# Patient Record
Sex: MALE | Race: WHITE | URBAN - METROPOLITAN AREA
[De-identification: names, ages, dates, MRNs, and addresses within clinical notes are randomized per-mention and may not be internally consistent; named-entity substitution may affect disease eponyms.]

---

## 2017-06-22 ENCOUNTER — OFFICE VISIT (OUTPATIENT)
Dept: FAMILY MEDICINE | Facility: CLINIC | Age: 27
End: 2017-06-22

## 2017-06-22 VITALS
DIASTOLIC BLOOD PRESSURE: 82 MMHG | HEIGHT: 75 IN | SYSTOLIC BLOOD PRESSURE: 116 MMHG | WEIGHT: 267.2 LBS | OXYGEN SATURATION: 98 % | HEART RATE: 80 BPM | RESPIRATION RATE: 16 BRPM | BODY MASS INDEX: 33.22 KG/M2 | TEMPERATURE: 98.7 F

## 2017-06-22 DIAGNOSIS — G47.33 OBSTRUCTIVE SLEEP APNEA SYNDROME: ICD-10-CM

## 2017-06-22 DIAGNOSIS — R06.83 SNORING: Primary | ICD-10-CM

## 2017-06-22 DIAGNOSIS — K21.9 GASTROESOPHAGEAL REFLUX DISEASE WITHOUT ESOPHAGITIS: ICD-10-CM

## 2017-06-22 NOTE — PATIENT INSTRUCTIONS
Here is the plan from today's visit    1. Snoring  Have sent you to a sleep specialist to have the conversation on what to do next  - Sleep Study Referral - INTERNAL; Future  - [facility]  Ph [phone] (Age 15 and up)    2. Obstructive sleep apnea syndrome  As above.   - Sleep Study Referral - INTERNAL; Future    3. Gastroesophageal reflux disease without esophagitis  Zantac 150 mg twice a day for 2 months or so and then can restart if symptoms come back  Tums as needed     Weight loss:  Mindless Eating  Always eating off of small plates  Half the plate should be vegetables  Weekly weighing   Plan to lose half pound per week maximum    Hearing:  OK to come back for us to check this or we can refer you for more formal testing. Any activity that is noisy, have to protect your hearing.         Please call or return to clinic if your symptoms don't go away.    Follow up plan  as needed     Thank you for coming to [facility] today.  Lab Testing:  **If you had lab testing today and your results are reassuring or normal they will be mailed to you or sent through [employer] within 7 days.   **If the lab tests need quick action we will call you with the results.  The phone number we will call with results is # [phone] (home) . If this is not the best number please call our clinic and change the number.  Medication Refills:  If you need any refills please call your pharmacy and they will contact us.   If you need to  your refill at a new pharmacy, please contact the new pharmacy directly. The new pharmacy will help you get your medications transferred faster.   Scheduling:  If you have any concerns about today's visit or wish to schedule another appointment please call our office during normal business hours [phone] (8-5:00 M-F)  If a referral was made to a [facility] and you don't get a call from central scheduling please call [phone].  If a  Mammogram was ordered for you at [facility] call [phone] to schedule or change your appointment.  If you had an XRay/CT/Ultrasound/MRI ordered the number is [phone] to schedule or change your radiology appointment.   Medical Concerns:  If you have urgent medical concerns please call [phone] at any time of the day.  If you have a medical emergency please call [phone].

## 2017-06-22 NOTE — PROGRESS NOTES
ALEIDA       Josr is a 26 year old  male  who presents for the new concern(s) of:    Chief Complaint   Patient presents with     Physical     Snoring     snors loudly especially when nose is stuffy      Abdominal Pain     having regular heartburn using OTC famotidine     Hearing Problem     difficulties hearing with a lot of background noise      Sleep apnea - after the corrective surgery, never had a follow up. Does have fatigue a lot, doesn't feel rested  Also having trouble falling asleep and if wakes up it is hard for him to fall asleep. + snoring    Heartburn - days where he wakes up with it.  Started taking OTC Famotidine - one pill when it bothers him or when he exercises. Trying to pin down if triggered by any foods but not for sure.  Has had this for as long as he can remember but more frequent last 2 years.      Weight - working on portions. Has steadily increased over the years but now holding steady.     Had full cholesterol screening and all was ok in the last 2 - 3y    Esxercise: now going to a gym and starting [employer] which he is likely,   Is going out kayaking or swimming with his fiance.   Etoh- pushing the 4 in a sitting. Is very mindful.  Does drink 2 drinks most evenings and when out might drink 4 - 5 in 6 hours.     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 (Patient not taking: Reported on 6/22/2017) 30 g 0        No Known Allergies             Review of Systems:  "  CONSTITUTIONAL: no fatigue, no unexpected change in weight  SKIN: no worrisome rashes, no worrisome moles, no worrisome lesions  EYES: no acute vision problems or changes  ENT: no ear problems, no mouth problems, no throat problems  RESP: no significant cough, no shortness of breath  CV: no chest pain, no palpitations, no new or worsening peripheral edema  GI: no nausea, no vomiting, no constipation, no diarrhea  : no frequency, no dysuria, no hematuria  MS: no claudication, no myalgias, no joint aches  NEURO: no weakness, no dizziness, no syncope, no headaches  ENDOCRINE: no temperature intolerance, no skin/hair changes  HEME: no easy bruising, no bleeding problems  PSYCHIATRIC: NEGATIVE for changes in mood or affect            Physical Exam:     Vitals:    06/22/17 1017   BP: 116/82   Pulse: 80   Resp: 16   Temp: 98.7  F (37.1  C)   TempSrc: Oral   SpO2: 98%   Weight: 267 lb 3.2 oz (121.2 kg)   Height: 6' 2.61\" (189.5 cm)     Body mass index is 33.75 kg/(m^2).  Vitals were reviewed and were normal  GENERAL: healthy, alert, well nourished, well hydrated, no distress  HENT: ear canals- normal; TMs- normal; Nose- normal; Mouth- no ulcers, no lesions - some mild post op changes but still with minimal posterior pharyngeal space.   NECK: no tenderness, no adenopathy, no asymmetry, no masses, no stiffness; thyroid- normal to palpation  RESP: lungs clear to auscultation - no rales, no rhonchi, no wheezes  CV: regular rates and rhythm, normal S1 S2, no S3 or S4 and no murmur, no click or rub -  ABDOMEN: soft, no tenderness, no  hepatosplenomegaly, no masses, normal bowel sounds  SKIN: no suspicious lesions, no rashes  PSYCH: Alert and oriented times 3; speech- coherent , normal rate and volume; able to articulate logical thoughts, able to abstract reason, no tangential thoughts, no hallucinations or delusions, affect- normal    No results found for any previous visit.      Assessment and Plan     Josr was seen today " for physical, snoring, abdominal pain and hearing problem.    Diagnoses and all orders for this visit:    CPE - up to date.  See below for weight discussion     Snoring - limited insurance so will have him meet with sleep medicine first to discuss options and cost. I believe he needs sleep study but also has some insomnia which might need some sleep hygiene.   -     Sleep Study Referral - INTERNAL; Future    Obstructive sleep apnea syndrome - as above  -     Sleep Study Referral - INTERNAL; Future    Gastroesophageal reflux disease without esophagitis - reviewed and recommended he take Zantac regluraly for 2 months to treat any irritation and then can back of to more prn for stretches.  Aware that weight makes this worse.  No warning signs. Reviewed what foods to avoid.   -     ranitidine (ZANTAC) 150 MG tablet; Take 1 tablet (150 mg) by mouth 2 times daily    Weight  - most of time discussing approaches to eating healthy.     Decreased hearing - offered to test her or refer and he chose to do nothing at this time. Recommended he be very careful with noise and using protection.     Etoh - drinking is borderline at this time -aware of what he should be doing. Will monitor.     There are no discontinued medications.    Options for treatment and follow-up care were reviewed with the patient . Josr Cai  engaged in the decision making process and verbalized understanding of the options discussed and agreed with the final plan.    Radha Gil MD

## 2017-06-22 NOTE — MR AVS SNAPSHOT
After Visit Summary   6/22/2017    Josr Cai    MRN: [id number]           Patient Information     Date Of Birth          1990        Visit Information        Provider Department      6/22/2017 10:00 AM Radha Gil MD [facility]        Today's Diagnoses     Snoring    -  1    Obstructive sleep apnea syndrome        Gastroesophageal reflux disease without esophagitis          Care Instructions        Here is the plan from today's visit    1. Snoring  Have sent you to a sleep specialist to have the conversation on what to do next  - Sleep Study Referral - INTERNAL; Future  - [facility]  Ph [phone] (Age 15 and up)    2. Obstructive sleep apnea syndrome  As above.   - Sleep Study Referral - INTERNAL; Future    3. Gastroesophageal reflux disease without esophagitis  Zantac 150 mg twice a day for 2 months or so and then can restart if symptoms come back  Tums as needed     Weight loss:  Mindless Eating  Always eating off of small plates  Half the plate should be vegetables  Weekly weighing   Plan to lose half pound per week maximum    Hearing:  OK to come back for us to check this or we can refer you for more formal testing. Any activity that is noisy, have to protect your hearing.         Please call or return to clinic if your symptoms don't go away.    Follow up plan  as needed     Thank you for coming to [place] today.  Lab Testing:  **If you had lab testing today and your results are reassuring or normal they will be mailed to you or sent through [employer] within 7 days.   **If the lab tests need quick action we will call you with the results.  The phone number we will call with results is # [phone] (home) . If this is not the best number please call our clinic and change the number.  Medication Refills:  If you need any refills please call your pharmacy and they will contact us.   If you need to  your refill at a new  pharmacy, please contact the new pharmacy directly. The new pharmacy will help you get your medications transferred faster.   Scheduling:  If you have any concerns about today's visit or wish to schedule another appointment please call our office during normal business hours [phone] (8-5:00 M-F)  If a referral was made to a [facility] and you don't get a call from central scheduling please call [phone].  If a Mammogram was ordered for you at [facility] call [phone] to schedule or change your appointment.  If you had an XRay/CT/Ultrasound/MRI ordered the number is [phone] to schedule or change your radiology appointment.   Medical Concerns:  If you have urgent medical concerns please call [phone] at any time of the day.  If you have a medical emergency please call [phone].          Follow-ups after your visit        Additional Services     Sleep Study Referral - INTERNAL       Please be aware that coverage of these services is subject to the terms and limitations of your health insurance plan.  Call member services at your health plan with any benefit or coverage questions.      Please bring the following to your appointment:    >>   List of current medications   >>   This referral request   >>   Any documents/labs given to you for this referral    [facility]  Ph [phone] (Age 15 and up)                  Future tests that were ordered for you today     Open Future Orders        Priority Expected Expires Ordered    Sleep Study Referral - INTERNAL Routine  6/22/2018 6/22/2017            Who to contact     Please call your clinic at [phone] to:    Ask questions about your health    Make or cancel appointments    Discuss your medicines    Learn about your test results    Speak to your doctor   If you have compliments or concerns about an experience at your clinic, or if you wish to file a complaint, please contact [facility]  "[place] [facility] Patient Relations at [phone] or email us at [email or web address]         Additional Information About Your Visit        [employer] Information     [employer] is an electronic gateway that provides easy, online access to your medical records. With [employer], you can request a clinic appointment, read your test results, renew a prescription or communicate with your care team.     To sign up for [employer] visit the website at [email or web address])   You will be asked to enter the access code listed below, as well as some personal information. Please follow the directions to create your username and password.     Your access code is: [email or web address]  Expires: 2017 11:06 AM     Your access code will  in 90 days. If you need help or a new code, please contact your [facility] or call [phone] for assistance.        Care EveryWhere ID     This is your Care EveryWhere ID. This could be used by other organizations to access your [facility] medical records  [id number]        Your Vitals Were     Pulse Temperature Respirations Height Pulse Oximetry BMI (Body Mass Index)    80 98.7  F (37.1  C) (Oral) 16 6' 2.61\" (189.5 cm) 98% 33.75 kg/m2       Blood Pressure from Last 3 Encounters:   17 116/82   16 126/81   10/08/15 125/82    Weight from Last 3 Encounters:   17 267 lb 3.2 oz (121.2 kg)   16 253 lb (114.8 kg)   10/08/15 240 lb (108.9 kg)                 Today's Medication Changes          These changes are accurate as of: 17 11:07 AM.  If you have any questions, ask your nurse or doctor.               Start taking these medicines.        Dose/Directions    ranitidine 150 MG tablet   Commonly known as:  ZANTAC   Used for:  Gastroesophageal reflux disease without esophagitis   Started by:  Radha Gil MD        Dose:  150 mg   Take 1 tablet (150 mg) by mouth 2 times daily   Quantity:  180 tablet   Refills:  1          "   Where to get your medicines      These medications were sent to [id number] - [place] [address] [place] [address] [address] [facility], [facility] [address]     Phone:  [phone]     ranitidine 150 MG tablet                Primary Care Provider Office Phone # Fax #    Kathleen Kala Ledbetter [phone]       [facility] [address]  [facility] [address]        Equal Access to Services     YASMIN BAILEY : Hadii aad ku hadasho Soomaali, waaxda luqadaha, qaybta kaalmada adeegyada, waxay idiin hayaan adeeg kharash la'kt . So [facility] [phone].    ATENCIÓN: Si habla español, tiene a kirkland disposición servicios gratuitos de asistencia lingüística. [facility] [phone].    We comply with applicable federal civil rights laws and [place] laws. We do not discriminate on the basis of race, color, national origin, age, disability sex, sexual orientation or gender identity.            Thank you!     Thank you for choosing [facility]  for your care. Our goal is always to provide you with excellent care. Hearing back from our patients is one way we can continue to improve our services. Please take a few minutes to complete the written survey that you may receive in the mail after your visit with us. Thank you!             Your Updated Medication List - Protect others around you: Learn how to safely use, store and throw away your medicines at www.disposemymeds.org.          This list is accurate as of: 6/22/17 11:07 AM.  Always use your most recent med list.                   Brand Name Dispense Instructions for use Diagnosis    hydrocortisone 1 % cream    CORTAID    30 g    Apply sparingly to affected area three times daily for 14 days.    Rash       polyethylene glycol powder    MIRALAX    510 g    Take 17 g by mouth daily    Constipation, unspecified constipation type       ranitidine 150 MG tablet    ZANTAC    180 tablet    Take 1 tablet (150  mg) by mouth 2 times daily    Gastroesophageal reflux disease without esophagitis

## 2017-08-28 ENCOUNTER — MYC MEDICAL ADVICE (OUTPATIENT)
Dept: FAMILY MEDICINE | Facility: CLINIC | Age: 27
End: 2017-08-28

## 2017-08-28 DIAGNOSIS — K21.9 GASTROESOPHAGEAL REFLUX DISEASE WITHOUT ESOPHAGITIS: ICD-10-CM

## 2017-09-07 ENCOUNTER — OFFICE VISIT (OUTPATIENT)
Dept: SLEEP MEDICINE | Facility: CLINIC | Age: 27
End: 2017-09-07
Attending: NURSE PRACTITIONER
Payer: COMMERCIAL

## 2017-09-07 VITALS
HEART RATE: 80 BPM | HEIGHT: 74 IN | RESPIRATION RATE: 16 BRPM | WEIGHT: 273 LBS | BODY MASS INDEX: 35.04 KG/M2 | OXYGEN SATURATION: 94 % | DIASTOLIC BLOOD PRESSURE: 78 MMHG | SYSTOLIC BLOOD PRESSURE: 118 MMHG

## 2017-09-07 DIAGNOSIS — G47.33 OSA (OBSTRUCTIVE SLEEP APNEA): Primary | ICD-10-CM

## 2017-09-07 DIAGNOSIS — E66.9 OBESITY (BMI 35.0-39.9 WITHOUT COMORBIDITY): ICD-10-CM

## 2017-09-07 DIAGNOSIS — G47.00 INSOMNIA, UNSPECIFIED TYPE: ICD-10-CM

## 2017-09-07 PROCEDURE — 99211 OFF/OP EST MAY X REQ PHY/QHP: CPT | Mod: ZF

## 2017-09-07 NOTE — NURSING NOTE
"Chief Complaint   Patient presents with     Consult     Dx with sleep apnea 10 years ago and tried cpap at that time and was unable to tolerate it at the time.  Would like to discuss starting over with cpap       Initial /78  Pulse 80  Resp 16  Ht 1.88 m (6' 2\")  Wt 123.8 kg (273 lb)  SpO2 94%  BMI 35.05 kg/m2 Estimated body mass index is 35.05 kg/(m^2) as calculated from the following:    Height as of this encounter: 1.88 m (6' 2\").    Weight as of this encounter: 123.8 kg (273 lb).  Medication Reconciliation: complete      [facility]      "

## 2017-09-07 NOTE — PROGRESS NOTES
"Allergies:    No Known Allergies    Medications:    Current Outpatient Prescriptions   Medication Sig Dispense Refill     ranitidine (ZANTAC) 150 MG tablet Take 1 tablet (150 mg) by mouth 2 times daily 180 tablet 3       Problem List:  Patient Active Problem List    Diagnosis Date Noted     Gastroesophageal reflux disease without esophagitis 06/22/2017     Priority: Medium     Snoring 06/22/2017     Priority: Medium     Obstructive sleep apnea syndrome 06/22/2017     Priority: Medium     Shin splints, left, initial encounter 05/23/2016     Priority: Medium     Flat feet, bilateral 05/23/2016     Priority: Medium        Past Medical/Surgical History:  Past Medical History:   Diagnosis Date     Anxiety      Dysthymia      Flat feet      Sleep apnea      Past Surgical History:   Procedure Laterality Date     HC TOOTH EXTRACTION W/FORCEP       SOFT TISSUE SURGERY      nose and throat due to sleep apnea           Physical Examination:  Vitals: /78  Pulse 80  Resp 16  Ht 1.88 m (6' 2\")  Wt 123.8 kg (273 lb)  SpO2 94%  BMI 35.05 kg/m2  BMI= Body mass index is 35.05 kg/(m^2).    Neck Cir (cm): 44 cm    Star Total Score 9/7/2017   Total score - Star 0       GENERAL APPEARANCE: healthy, alert and no distress  EYES: Eyes grossly normal to inspection  HENT: oropharynx crowded, soft palate dependent with small oral airway  NECK: thyroid normal to palpation  RESP: lungs clear to auscultation - no rales, rhonchi or wheezes  CV: regular rates and rhythm, normal S1 S2, no S3 or S4 and no murmur, click or rub  Extremities are without clubbing, edema  Musculoskeletal:  Mallampati Class: IV.  Tonsillar Stage: 0  surgically removed.  Dental: Dentition in good condition.  Good advancement of lower jaw without tmd  Skin: without facial rash        CC: Radha Gil"

## 2017-09-07 NOTE — MR AVS SNAPSHOT
"              After Visit Summary   9/7/2017    Josr Cai    MRN: [id number]           Patient Information     Date Of Birth          1990        Visit Information        Provider Department      9/7/2017 3:00 PM Susana Lyons APRN [facility], [place], Sleep Study        Today's Diagnoses     SHWETHA (obstructive sleep apnea)    -  1      Care Instructions    MY TREATMENT INFORMATION FOR SLEEP APNEA-  Josr Cai    DOCTOR : Susana Lyons  SLEEP CENTER :      MY CONTACT NUMBER: [phone]  No laying down on couch for 7 days  No clock watching at night  Avoid use of screens 45 minutes before sleep, change light setting  Hst in 10-12 days  Contact me in 7 days via my chart re ability to get 6-7 hrs  Am I having a sleep study at a sleep center?  Make sure you have an appointment for the study before you leave!    Am I having a home sleep study?  Watch this video:  https://www.[employer].com/watch?v=CteI_GhyP9g&list=[email or web address]    Frequently asked questions:  1. What is Obstructive Sleep Apnea (SHWETHA)? SHWETHA is the most common type of sleep apnea. Apnea means, \"without breath.\"  Apnea is most often caused by narrowing or collapse of the upper airway as muscles relax during sleep.   Almost everyone has occasional apneas. Most people with sleep apnea have had brief interruptions at night frequently for many years.  The severity of sleep apnea is related to how frequent and severe the events are.   2. What are the consequences of SHWETHA? Symptoms include: feeling sleepy during the day, snoring loudly, gasping or stopping of breathing, trouble sleeping, and occasionally morning headaches or heartburn at night.  Sleepiness can be serious and even increase the risk of falling asleep while driving. Other health consequences may include development of high blood pressure and other cardiovascular disease in persons who are susceptible. Untreated SHWETHA  can contribute to heart disease, stroke and " diabetes.   3. What are the treatment options? In most situations, sleep apnea is a lifelong disease that must be managed with daily therapy. Medications are not effective for sleep apnea and surgery is generally not considered until other therapies have been tried. Your treatment is your choice . Continuous Positive Airway (CPAP) works right away and is the therapy that is effective in nearly everyone. An oral device to hold your jaw forward is usually the next most reliable option. Other options include postioning devices (to keep you off your back), weight loss, and surgery including a tongue pacing device. There is more detail about some of these options below.    Important tips for using CPAP and similar devices   Know your equipment:  CPAP is continuous positive airway pressure that prevents obstructive sleep apnea by keeping the throat from collapsing while you are sleeping. In most cases, the device is  smart  and can slowly self-adjusts if your throat collapses and keeps a record every day of how well you are treated-this information is available to you and your care team.  BPAP is bilevel positive airway pressure that keeps your throat open and also assists each breath with a pressure boost to maintain adequate breathing.  Special kinds of BPAP are used in patients who have inadequate breathing from lung or heart disease. In most cases, the device is  smart  and can slowly self-adjusts to assist breathing. Like CPAP, the device keeps a record of how well you are treated.  Your mask is your connection to the device. You get to choose what feels most comfortable and the staff will help to make sure if fits. Here: are some examples of the different masks that are available:       Key points to remember on your journey with sleep apnea:  1. Sleep study.  PAP devices often need to be adjusted during a sleep study to show that they are effective and adjusted right.  2. Good tips to remember: Try wearing just the  mask during a quiet time during the day so your body adapts to wearing it. A humidifier is recommended for comfort in most cases to prevent drying of your nose and throat. Allergy medication from your provider may help you if you are having nasal congestion.  3. Getting settled-in. It takes more than one night for most of us to get used to wearing a mask. Try wearing just the mask during a quiet time during the day so your body adapts to wearing it. A humidifier is recommended for comfort in most cases. Our team will work with you carefully on the first day and will be in contact within 4 days and again at 2 and 4 weeks for advice and remote device adjustments. Your therapy is evaluated by the device each day.   4. Use it every night. The more you are able to sleep naturally for 7-8 hours, the more likely you will have good sleep and to prevent health risks or symptoms from sleep apnea. Even if you use it 4 hours it helps. Occasionally all of us are unable to use a medical therapy, in sleep apnea, it is not dangerous to miss one night.   5. Communicate. Call our skilled team on the number provided on the first day if your visit for problems that make it difficult to wear the device. Over 2 out of 3 patients can learn to wear the device long-term with help from our team. Remember to call our team or your sleep providers if you are unable to wear the device as we may have other solutions for those who cannot adapt to mask CPAP therapy. It is recommended that you sleep your sleep provider within the first 3 months and yearly after that if you are not having problems.   Take care of your equipment. Make sure you clean your mask and tubing using directions every day and that your filter and mask are replaced as recommended or if they are not working.     BESIDES CPAP, WHAT OTHER THERAPIES ARE THERE?    Positioning Device  Positioning devices are generally used when sleep apnea is mild and only occurs on your back.This  example shows a pillow that straps around the waist. It may be appropriate for those whose sleep study shows milder sleep apnea that occurs primarily when lying flat on one's back. Preliminary studies have shown benefit but effectiveness at home may need to be verified by a home sleep test. These devices are generally not covered by medical insurance.  Positioning Device  Positioning devices are generally used when sleep apnea is mild and only occurs on your back.This example shows a pillow that straps around the waist. It may be appropriate for those whose sleep study shows milder sleep apnea that occurs primarily when lying flat on one's back. Preliminary studies have shown benefit but effectiveness at home may need to be verified by a home sleep test. These devices are generally not covered by medical insurance.    BeSmart or   MemBlaze for snore bumper pillow;or backpack w/ chest strap stuffed w/ pillow; ; or t shirt 2 / pockets sew in tennis balls    Oral Appliance  What is oral appliance therapy?  An oral appliance device fits on your teeth at night like a retainer used after having braces. The device is made by a specialized dentist and requires several visits over 1-2 months before a manufactured device is made to fit your teeth and is adjusted to prevent your sleep apnea. Once an oral device is working properly, snoring should be improved. A home sleep test may be recommended at that time if to determine whether the sleep apnea is adequately treated.       Some things to remember:  -Oral devices are often, but not always, covered by your medical insurance. Be sure to check with your insurance provider.   -If you are referred for oral therapy, you will be given a list of specialized dentists to consider or you may choose to visit the Web site of the American Academy of Dental Sleep Medicine  -Oral devices are less likely to work if you have severe sleep apnea or are extremely overweight.     More detailed  information  An oral appliance is a small acrylic device that fits over the upper and lower teeth  (similar to a retainer or a mouth guard). This device slightly moves jaw forward, which moves the base of the tongue forward, opens the airway, improves breathing for effective treat snoring and obstructive sleep apnea in perhaps 7 out of 10 people .  The best working devices are custom-made by a dental device  after a mold is made of the teeth 1, 2, 3.  When is an oral appliance indicated?  Oral appliance therapy is recommended as a first-line treatment for patients with primary snoring, mild sleep apnea, and for patients with moderate sleep apnea who prefer appliance therapy to use of CPAP4, 5. Severity of sleep apnea is determined by sleep testing and is based on the number of respiratory events per hour of sleep.   How successful is oral appliance therapy?  The success rate of oral appliance therapy in patients with mild sleep apnea is 75-80% while in patients with moderate sleep apnea it is 50-70%. The chance of success in patients with severe sleep apnea is 40-50%. The research also shows that oral appliances have a beneficial effect on the cardiovascular health of SHWETHA patients at the same magnitude as CPAP therapy7.  Oral appliances should be a second-line treatment in cases of severe sleep apnea, but if not completely successful then a combination therapy utilizing CPAP plus oral appliance therapy may be effective. Oral appliances tend to be effective in a broad range of patients although studies show that the patients who have the highest success are females, younger patients, those with milder disease, and less severe obesity. 3, 6.   Finding a dentist that practices dental sleep medicine  Specific training is available through the American Academy of Dental Sleep Medicine for dentists interested in working in the field of sleep. To find a dentist who is educated in the field of sleep and the use  of oral appliances, near you, visit the Web site of the American Academy of Dental Sleep Medicine.    References  1. Peña, et al. Objectively measured vs self-reported compliance during oral appliance therapy for sleep-disordered breathing. Chest 2013; 144(5): 4187-4479.  2. Kayla et al. Objective measurement of compliance during oral appliance therapy for sleep-disordered breathing. Thorax 2013; 68(1): 91-96.  3. Lonnie et al. Mandibular advancement devices in 620 men and women with SHWETHA and snoring: tolerability and predictors of treatment success. Chest 2004; 125: 7731-1546.  4. Kori, et al. Oral appliances for snoring and SHWETHA: a review. Sleep 2006; 29: 244-262.  5. Man et al. Oral appliance treatment for SHWETHA: an update. J Clin Sleep Med 2014; 10(2): 215-227.  6. Estrella et al. Predictors of OSAH treatment outcome. J Dent Res 2007; 86: 5100-4675.      Weight Loss:    Weight loss is a long-term strategy that may improve sleep apnea in some patients.    Weight management is a personal decision.  If you are interested in exploring weight loss strategies, the following discussion covers the impact on weight loss on sleep apnea and the approaches that may be successful.    Weight loss decreases severity of sleep apnea in most people with obesity. For those with mild obesity who have developed snoring with weight gain, even 15-30 pound weight loss can improve and occasionally eliminate sleep apnea.  Structured and life-long dietary and health habits are necessary to lose weight and keep healthier weight levels.     Though there may be significant health benefits from weight loss, long-term weight loss is very difficult to achieve- studies show success with dietary management in less than 10% of people. In addition, substantial weight loss may require years of dietary control and may be difficult if patients have severe obesity. In these cases, surgical management may be considered.  Finally,  older individuals who have tolerated obesity without health complications may be less likely to benefit from weight loss strategies.      [unfilled]    Surgery:    Surgery for obstructive sleep apnea is considered generally only when other therapies fail to work. Surgery may be discussed with you if you are having a difficult time tolerating CPAP and or when there is an abnormal structure that requires surgical correction.  Nose and throat surgeries often enlarge the airway to prevent collapse.  Most of these surgeries create pain for 1-2 weeks and up to half of the most common surgeries are not effective throughout life.  You should carefully discuss the benefits and drawbacks to surgery with your sleep provider and surgeon to determine if it is the best solution for you.   More information  Surgery for SHWETHA is directed at areas that are responsible for narrowing or complete obstruction of the airway during sleep.  There are a wide range of procedures available to enlarge and/or stabilize the airway to prevent blockage of breathing in the three major areas where it can occur: the palate, tongue, and nasal regions.  Successful surgical treatment depends on the accurate identification of the factors responsible for obstructive sleep apnea in each person.  A personalized approach is required because there is no single treatment that works well for everyone.  Because of anatomic variation, consultation with an examination by a sleep surgeon is a critical first step in determining what surgical options are best for each patient.  In some cases, examination during sedation may be recommended in order to guide the selection of procedures.  Patients will be counseled about risks and benefits as well as the typical recovery course after surgery. Surgery is typically not a cure for a person s SHWETHA.  However, surgery will often significantly improve one s SHWETHA severity (termed  success rate ).  Even in the absence of a cure,  surgery will decrease the cardiovascular risk associated with OSA7; improve overall quality of life8 (sleepiness, functionality, sleep quality, etc).      Palate Procedures:  Patients with SHWETHA often have narrowing of their airway in the region of their tonsils and uvula.  The goals of palate procedures are to widen the airway in this region as well as to help the tissues resist collapse.  Modern palate procedure techniques focus on tissue conservation and soft tissue rearrangement, rather than tissue removal.  Often the uvula is preserved in this procedure. Residual sleep apnea is common in patient after pharyngoplasty with an average reduction in sleep apnea events of 33%2.      Tongue Procedures:  ExamWhile patients are awake, the muscles that surround the throat are active and keep this region open for breathing. These muscles relax during sleep, allowing the tongue and other structures to collapse and block breathing.  There are several different tongue procedures available.  Selection of a tongue base procedure depends on characteristics seen on physical exam.  Generally, procedures are aimed at removing bulky tissues in this area or preventing the back of the tongue from falling back during sleep.  Success rates for tongue surgery range from 50-62%3.    Hypoglossal Nerve Stimulation:  Hypoglossal nerve stimulation has recently received approval from the [place] Food and Drug Administration for the treatment of obstructive sleep apnea.  This is based on research showing that the system was safe and effective in treating sleep apnea6.  Results showed that the median AHI score decreased 68%, from 29.3 to 9.0. This therapy uses an implant system that senses breathing patterns and delivers mild stimulation to airway muscles, which keeps the airway open during sleep.  The system consists of three fully implanted components: a small generator (similar in size to a pacemaker), a breathing sensor, and a  stimulation lead.  Using a small handheld remote, a patient turns the therapy on before bed and off upon awakening.    Candidates for this device must be greater than 22 years of age, have moderate to severe SHWETHA (AHI between 20-65), BMI less than 32, have tried CPAP/oral appliance without success, and have appropriate upper airway anatomy (determined by a sleep endoscopy performed by Dr. Shore).    Hypoglossal Nerve Stimulation Pathway:    The sleep surgeon s office will work with the patient through the insurance prior-authorization process (including communications and appeals).    Nasal Procedures:  Nasal obstruction can interfere with nasal breathing during the day and night.  Studies have shown that relief of nasal obstruction can improve the ability of some patients to tolerate positive airway pressure therapy for obstructive sleep apnea1.  Treatment options include medications such as nasal saline, topical corticosteroid and antihistamine sprays, and oral medications such as antihistamines or decongestants. Non-surgical treatments can include external nasal dilators for selected patients. If these are not successful by themselves, surgery can improve the nasal airway either alone or in combination with these other options.      Combination Procedures:  Combination of surgical procedures and other treatments may be recommended, particularly if patients have more than one area of narrowing or persistent positional disease.  The success rate of combination surgery ranges from 66-80%2,3.    References  1. Geovanny MOLINA. The Role of the Nose in Snoring and Obstructive Sleep Apnoea: An Update.  Eur Arch Otorhinolaryngol. 2011; 268: 1365-73.  2.  Kathy SM; Sapna JA; Annamaria JR; Pallanch JF; Miguel Angel MB; Devika SG; Tiffanie NICHOLSON. Surgical modifications of the upper airway for obstructive sleep apnea in adults: a systematic review and meta-analysis. SLEEP 2010;33(10):6039-6167. Bobby BURROWS. Hypopharyngeal surgery in  obstructive sleep apnea: an evidence-based medicine review.  Arch Otolaryngol Head Neck Surg. 2006 Feb;132(2):206-13.  3. Nehemiah YH1, Nat Y, Ismael SANDY. The efficacy of anatomically based multilevel surgery for obstructive sleep apnea. Otolaryngol Head Neck Surg. 2003 Oct;129(4):327-35.  4. Bobby BURROWS, Goldberg A. Hypopharyngeal Surgery in Obstructive Sleep Apnea: An Evidence-Based Medicine Review. Arch Otolaryngol Head Neck Surg. 2006 Feb;132(2):206-13.  5. Lynsey CASTREJON et al. Upper-Airway Stimulation for Obstructive Sleep Apnea.  N Engl J Med. 2014 Jan 9;370(2):139-49.  6. Annie Y et al. Increased Incidence of Cardiovascular Disease in Middle-aged Men with Obstructive Sleep Apnea. Am J Respir Crit Care Med; 2002 166: 159-165  7. Schwartz EM et al. Studying Life Effects and Effectiveness of Palatopharyngoplasty (SLEEP) study: Subjective Outcomes of Isolated Uvulopalatopharyngoplasty. Otolaryngol Head Neck Surg. 2011; 144: 623-631.              Your BMI is Body mass index is 35.05 kg/(m^2).  Weight management is a personal decision.  If you are interested in exploring weight loss strategies, the following discussion covers the approaches that may be successful. Body mass index (BMI) is one way to tell whether you are at a healthy weight, overweight, or obese. It measures your weight in relation to your height.  A BMI of 18.5 to 24.9 is in the healthy range. A person with a BMI of 25 to 29.9 is considered overweight, and someone with a BMI of 30 or greater is considered obese. More than two-thirds of American adults are considered overweight or obese.  Being overweight or obese increases the risk for further weight gain. Excess weight may lead to heart disease and diabetes.  Creating and following plans for healthy eating and physical activity may help you improve your health.  Weight control is part of healthy lifestyle and includes exercise, emotional health, and healthy eating habits. Careful eating habits lifelong are  the mainstay of weight control. Though there are significant health benefits from weight loss, long-term weight loss with diet alone may be very difficult to achieve- studies show long-term success with dietary management in less than 10% of people. Attaining a healthy weight may be especially difficult to achieve in those with severe obesity. In some cases, medications, devices and surgical management might be considered.  What can you do?  If you are overweight or obese and are interested in methods for weight loss, you should discuss this with your provider.     Consider reducing daily calorie intake by 500 calories.     Keep a food journal.     Avoiding skipping meals, consider cutting portions instead.    Diet combined with exercise helps maintain muscle while optimizing fat loss. Strength training is particularly important for building and maintaining muscle mass. Exercise helps reduce stress, increase energy, and improves fitness. Increasing exercise without diet control, however, may not burn enough calories to loose weight.       Start walking three days a week 10-20 minutes at a time    Work towards walking thirty minutes five days a week     Eventually, increase the speed of your walking for 1-2 minutes at time    In addition, we recommend that you review healthy lifestyles and methods for weight loss available through the National Institutes of Health patient information sites:  http://win.niddk.nih.gov/publications/index.htm    And look into health and wellness programs that may be available through your health insurance provider, employer, local community center, or jennifer club.    Weight management plan: Patient was referred to their PCP to discuss a diet and exercise plan.              Follow-ups after your visit        Follow-up notes from your care team     Return for hst schedule out 10-12 days.      Your next 10 appointments already scheduled     Sep 21, 2017 11:00 AM CDT   HST  with SLEEP  STUDY RM 7   [facility] [place], Sleep Study ([facility])    [address] [place] [id number]   [phone]            Sep 22, 2017 10:45 AM CDT   HST Drop Off with DME SCHEDULE   [facility] [place], Sleep Study ([facility])    [address] [facility] [address]   [phone]            Sep 28, 2017  2:00 PM CDT   Telephone Visit with TAWANDA Felix CNP   [facility] [place], Sleep Study ([facility])    [address] [place] [id number] [phone]           Note: this is not an onsite visit; there is no need to come to the facility.              Future tests that were ordered for you today     Open Future Orders        Priority Expected Expires Ordered    HST-Home Sleep Apnea Test Routine  3/9/2018 9/7/2017            Who to contact     If you have questions or need follow up information about today's clinic visit or your schedule please contact [facility] [place], SLEEP STUDY directly at [phone].  Normal or non-critical lab and imaging results will be communicated to you by [employer], letter or phone within 4 business days after the clinic has received the results. If you do not hear from us within 7 days, please contact the clinic through [employer] or phone. If you have a critical or abnormal lab result, we will notify you by phone as soon as possible.  Submit refill requests through [employer] or call your pharmacy and they will forward the refill request to us. Please allow 3 business days for your refill to be completed.          Additional Information About Your Visit        [employer] Information     [employer] gives you secure access to your electronic health record. If you see a primary care provider, you can also send messages to your care team and make appointments. If you have questions, please call your primary care clinic.  If you do not have a  "primary care provider, please call [phone] and they will assist you.        Care EveryWhere ID     This is your Care EveryWhere ID. This could be used by other organizations to access your [place] medical records  [id number]        Your Vitals Were     Pulse Respirations Height Pulse Oximetry BMI (Body Mass Index)       80 16 1.88 m (6' 2\") 94% 35.05 kg/m2        Blood Pressure from Last 3 Encounters:   09/07/17 118/78   06/22/17 116/82   05/23/16 126/81    Weight from Last 3 Encounters:   09/07/17 123.8 kg (273 lb)   06/22/17 121.2 kg (267 lb 3.2 oz)   05/23/16 114.8 kg (253 lb)               Primary Care Provider Office Phone # Fax #    Kathleen Ledbetter [phone]       [address] [facility] [address]        Equal Access to Services     YASMIN BAILEY : Hadii magdalena lawo Soerrol, waaxda luqadaha, qaybta kaalmada adejacquesyada, lyle bah . So [facility] [phone].    ATENCIÓN: Si habla español, tiene a kirkland disposición servicios gratuitos de asistencia lingüística. Llame al [phone].    We comply with applicable federal civil rights laws and [place] laws. We do not discriminate on the basis of race, color, national origin, age, disability sex, sexual orientation or gender identity.            Thank you!     Thank you for choosing [facility], SLEEP STUDY  for your care. Our goal is always to provide you with excellent care. Hearing back from our patients is one way we can continue to improve our services. Please take a few minutes to complete the written survey that you may receive in the mail after your visit with us. Thank you!             Your Updated Medication List - Protect others around you: Learn how to safely use, store and throw away your medicines at www.disposemymeds.org.          This list is accurate as of: 9/7/17  4:19 PM.  Always use your most recent med list.                   Brand Name Dispense Instructions for use Diagnosis    " ranitidine 150 MG tablet    ZANTAC    180 tablet    Take 1 tablet (150 mg) by mouth 2 times daily    Gastroesophageal reflux disease without esophagitis

## 2017-09-07 NOTE — PATIENT INSTRUCTIONS
"MY TREATMENT INFORMATION FOR SLEEP APNEA-  Josr Cai    DOCTOR : Susana Lyons  SLEEP CENTER :      MY CONTACT NUMBER: [phone]  No laying down on couch for 7 days  No clock watching at night  Avoid use of screens 45 minutes before sleep, change light setting  Hst in 10-12 days  Contact me in 7 days via my chart re ability to get 6-7 hrs  Am I having a sleep study at a sleep center?  Make sure you have an appointment for the study before you leave!    Am I having a home sleep study?  Watch this video:  https://www.[employer]sc[employer].com/watch?v=CteI_GhyP9g&[email or web address]    Frequently asked questions:  1. What is Obstructive Sleep Apnea (SHWETHA)? SHWETHA is the most common type of sleep apnea. Apnea means, \"without breath.\"  Apnea is most often caused by narrowing or collapse of the upper airway as muscles relax during sleep.   Almost everyone has occasional apneas. Most people with sleep apnea have had brief interruptions at night frequently for many years.  The severity of sleep apnea is related to how frequent and severe the events are.   2. What are the consequences of SHWETHA? Symptoms include: feeling sleepy during the day, snoring loudly, gasping or stopping of breathing, trouble sleeping, and occasionally morning headaches or heartburn at night.  Sleepiness can be serious and even increase the risk of falling asleep while driving. Other health consequences may include development of high blood pressure and other cardiovascular disease in persons who are susceptible. Untreated SHWETHA  can contribute to heart disease, stroke and diabetes.   3. What are the treatment options? In most situations, sleep apnea is a lifelong disease that must be managed with daily therapy. Medications are not effective for sleep apnea and surgery is generally not considered until other therapies have been tried. Your treatment is your choice . Continuous Positive Airway (CPAP) works right away and is the therapy that " is effective in nearly everyone. An oral device to hold your jaw forward is usually the next most reliable option. Other options include postioning devices (to keep you off your back), weight loss, and surgery including a tongue pacing device. There is more detail about some of these options below.    Important tips for using CPAP and similar devices   Know your equipment:  CPAP is continuous positive airway pressure that prevents obstructive sleep apnea by keeping the throat from collapsing while you are sleeping. In most cases, the device is  smart  and can slowly self-adjusts if your throat collapses and keeps a record every day of how well you are treated-this information is available to you and your care team.  BPAP is bilevel positive airway pressure that keeps your throat open and also assists each breath with a pressure boost to maintain adequate breathing.  Special kinds of BPAP are used in patients who have inadequate breathing from lung or heart disease. In most cases, the device is  smart  and can slowly self-adjusts to assist breathing. Like CPAP, the device keeps a record of how well you are treated.  Your mask is your connection to the device. You get to choose what feels most comfortable and the staff will help to make sure if fits. Here: are some examples of the different masks that are available:       Key points to remember on your journey with sleep apnea:  1. Sleep study.  PAP devices often need to be adjusted during a sleep study to show that they are effective and adjusted right.  2. Good tips to remember: Try wearing just the mask during a quiet time during the day so your body adapts to wearing it. A humidifier is recommended for comfort in most cases to prevent drying of your nose and throat. Allergy medication from your provider may help you if you are having nasal congestion.  3. Getting settled-in. It takes more than one night for most of us to get used to wearing a mask. Try wearing  just the mask during a quiet time during the day so your body adapts to wearing it. A humidifier is recommended for comfort in most cases. Our team will work with you carefully on the first day and will be in contact within 4 days and again at 2 and 4 weeks for advice and remote device adjustments. Your therapy is evaluated by the device each day.   4. Use it every night. The more you are able to sleep naturally for 7-8 hours, the more likely you will have good sleep and to prevent health risks or symptoms from sleep apnea. Even if you use it 4 hours it helps. Occasionally all of us are unable to use a medical therapy, in sleep apnea, it is not dangerous to miss one night.   5. Communicate. Call our skilled team on the number provided on the first day if your visit for problems that make it difficult to wear the device. Over 2 out of 3 patients can learn to wear the device long-term with help from our team. Remember to call our team or your sleep providers if you are unable to wear the device as we may have other solutions for those who cannot adapt to mask CPAP therapy. It is recommended that you sleep your sleep provider within the first 3 months and yearly after that if you are not having problems.   Take care of your equipment. Make sure you clean your mask and tubing using directions every day and that your filter and mask are replaced as recommended or if they are not working.     BESIDES CPAP, WHAT OTHER THERAPIES ARE THERE?    Positioning Device  Positioning devices are generally used when sleep apnea is mild and only occurs on your back.This example shows a pillow that straps around the waist. It may be appropriate for those whose sleep study shows milder sleep apnea that occurs primarily when lying flat on one's back. Preliminary studies have shown benefit but effectiveness at home may need to be verified by a home sleep test. These devices are generally not covered by medical insurance.  Positioning  Device  Positioning devices are generally used when sleep apnea is mild and only occurs on your back.This example shows a pillow that straps around the waist. It may be appropriate for those whose sleep study shows milder sleep apnea that occurs primarily when lying flat on one's back. Preliminary studies have shown benefit but effectiveness at home may need to be verified by a home sleep test. These devices are generally not covered by medical insurance.    Amazon or   ebay for snore bumper pillow;or backpack w/ chest strap stuffed w/ pillow; ; or t shirt 2 / pockets sew in tennis balls    Oral Appliance  What is oral appliance therapy?  An oral appliance device fits on your teeth at night like a retainer used after having braces. The device is made by a specialized dentist and requires several visits over 1-2 months before a manufactured device is made to fit your teeth and is adjusted to prevent your sleep apnea. Once an oral device is working properly, snoring should be improved. A home sleep test may be recommended at that time if to determine whether the sleep apnea is adequately treated.       Some things to remember:  -Oral devices are often, but not always, covered by your medical insurance. Be sure to check with your insurance provider.   -If you are referred for oral therapy, you will be given a list of specialized dentists to consider or you may choose to visit the Web site of the American Academy of Dental Sleep Medicine  -Oral devices are less likely to work if you have severe sleep apnea or are extremely overweight.     More detailed information  An oral appliance is a small acrylic device that fits over the upper and lower teeth  (similar to a retainer or a mouth guard). This device slightly moves jaw forward, which moves the base of the tongue forward, opens the airway, improves breathing for effective treat snoring and obstructive sleep apnea in perhaps 7 out of 10 people .  The best working  devices are custom-made by a dental device  after a mold is made of the teeth 1, 2, 3.  When is an oral appliance indicated?  Oral appliance therapy is recommended as a first-line treatment for patients with primary snoring, mild sleep apnea, and for patients with moderate sleep apnea who prefer appliance therapy to use of CPAP4, 5. Severity of sleep apnea is determined by sleep testing and is based on the number of respiratory events per hour of sleep.   How successful is oral appliance therapy?  The success rate of oral appliance therapy in patients with mild sleep apnea is 75-80% while in patients with moderate sleep apnea it is 50-70%. The chance of success in patients with severe sleep apnea is 40-50%. The research also shows that oral appliances have a beneficial effect on the cardiovascular health of SHWETHA patients at the same magnitude as CPAP therapy7.  Oral appliances should be a second-line treatment in cases of severe sleep apnea, but if not completely successful then a combination therapy utilizing CPAP plus oral appliance therapy may be effective. Oral appliances tend to be effective in a broad range of patients although studies show that the patients who have the highest success are females, younger patients, those with milder disease, and less severe obesity. 3, 6.   Finding a dentist that practices dental sleep medicine  Specific training is available through the American Academy of Dental Sleep Medicine for dentists interested in working in the field of sleep. To find a dentist who is educated in the field of sleep and the use of oral appliances, near you, visit the Web site of the American Academy of Dental Sleep Medicine.    References  1. Peña et al. Objectively measured vs self-reported compliance during oral appliance therapy for sleep-disordered breathing. Chest 2013; 144(5): 1988-9113.  2. Kayla et al. Objective measurement of compliance during oral appliance therapy  for sleep-disordered breathing. Thorax 2013; 68(1): 91-96.  3. Lonnie et al. Mandibular advancement devices in 620 men and women with SHWETHA and snoring: tolerability and predictors of treatment success. Chest 2004; 125: 6334-1236.  4. Kori et al. Oral appliances for snoring and SHWETHA: a review. Sleep 2006; 29: 244-262.  5. Man et al. Oral appliance treatment for SHWETHA: an update. J Clin Sleep Med 2014; 10(2): 215-227.  6. Estrella et al. Predictors of OSAH treatment outcome. J Dent Res 2007; 86: 2022-4645.      Weight Loss:    Weight loss is a long-term strategy that may improve sleep apnea in some patients.    Weight management is a personal decision.  If you are interested in exploring weight loss strategies, the following discussion covers the impact on weight loss on sleep apnea and the approaches that may be successful.    Weight loss decreases severity of sleep apnea in most people with obesity. For those with mild obesity who have developed snoring with weight gain, even 15-30 pound weight loss can improve and occasionally eliminate sleep apnea.  Structured and life-long dietary and health habits are necessary to lose weight and keep healthier weight levels.     Though there may be significant health benefits from weight loss, long-term weight loss is very difficult to achieve- studies show success with dietary management in less than 10% of people. In addition, substantial weight loss may require years of dietary control and may be difficult if patients have severe obesity. In these cases, surgical management may be considered.  Finally, older individuals who have tolerated obesity without health complications may be less likely to benefit from weight loss strategies.      [unfilled]    Surgery:    Surgery for obstructive sleep apnea is considered generally only when other therapies fail to work. Surgery may be discussed with you if you are having a difficult time tolerating CPAP and or when there  is an abnormal structure that requires surgical correction.  Nose and throat surgeries often enlarge the airway to prevent collapse.  Most of these surgeries create pain for 1-2 weeks and up to half of the most common surgeries are not effective throughout life.  You should carefully discuss the benefits and drawbacks to surgery with your sleep provider and surgeon to determine if it is the best solution for you.   More information  Surgery for SHWETHA is directed at areas that are responsible for narrowing or complete obstruction of the airway during sleep.  There are a wide range of procedures available to enlarge and/or stabilize the airway to prevent blockage of breathing in the three major areas where it can occur: the palate, tongue, and nasal regions.  Successful surgical treatment depends on the accurate identification of the factors responsible for obstructive sleep apnea in each person.  A personalized approach is required because there is no single treatment that works well for everyone.  Because of anatomic variation, consultation with an examination by a sleep surgeon is a critical first step in determining what surgical options are best for each patient.  In some cases, examination during sedation may be recommended in order to guide the selection of procedures.  Patients will be counseled about risks and benefits as well as the typical recovery course after surgery. Surgery is typically not a cure for a person s SHWETHA.  However, surgery will often significantly improve one s SHWETHA severity (termed  success rate ).  Even in the absence of a cure, surgery will decrease the cardiovascular risk associated with OSA7; improve overall quality of life8 (sleepiness, functionality, sleep quality, etc).      Palate Procedures:  Patients with SHWETHA often have narrowing of their airway in the region of their tonsils and uvula.  The goals of palate procedures are to widen the airway in this region as well as to help the  tissues resist collapse.  Modern palate procedure techniques focus on tissue conservation and soft tissue rearrangement, rather than tissue removal.  Often the uvula is preserved in this procedure. Residual sleep apnea is common in patient after pharyngoplasty with an average reduction in sleep apnea events of 33%2.      Tongue Procedures:  ExamWhile patients are awake, the muscles that surround the throat are active and keep this region open for breathing. These muscles relax during sleep, allowing the tongue and other structures to collapse and block breathing.  There are several different tongue procedures available.  Selection of a tongue base procedure depends on characteristics seen on physical exam.  Generally, procedures are aimed at removing bulky tissues in this area or preventing the back of the tongue from falling back during sleep.  Success rates for tongue surgery range from 50-62%3.    Hypoglossal Nerve Stimulation:  Hypoglossal nerve stimulation has recently received approval from the [place] Food and Drug Administration for the treatment of obstructive sleep apnea.  This is based on research showing that the system was safe and effective in treating sleep apnea6.  Results showed that the median AHI score decreased 68%, from 29.3 to 9.0. This therapy uses an implant system that senses breathing patterns and delivers mild stimulation to airway muscles, which keeps the airway open during sleep.  The system consists of three fully implanted components: a small generator (similar in size to a pacemaker), a breathing sensor, and a stimulation lead.  Using a small handheld remote, a patient turns the therapy on before bed and off upon awakening.    Candidates for this device must be greater than 22 years of age, have moderate to severe SHWETHA (AHI between 20-65), BMI less than 32, have tried CPAP/oral appliance without success, and have appropriate upper airway anatomy (determined by a sleep endoscopy  performed by Dr. Shore).    Hypoglossal Nerve Stimulation Pathway:    The sleep surgeon s office will work with the patient through the insurance prior-authorization process (including communications and appeals).    Nasal Procedures:  Nasal obstruction can interfere with nasal breathing during the day and night.  Studies have shown that relief of nasal obstruction can improve the ability of some patients to tolerate positive airway pressure therapy for obstructive sleep apnea1.  Treatment options include medications such as nasal saline, topical corticosteroid and antihistamine sprays, and oral medications such as antihistamines or decongestants. Non-surgical treatments can include external nasal dilators for selected patients. If these are not successful by themselves, surgery can improve the nasal airway either alone or in combination with these other options.      Combination Procedures:  Combination of surgical procedures and other treatments may be recommended, particularly if patients have more than one area of narrowing or persistent positional disease.  The success rate of combination surgery ranges from 66-80%2,3.    References  1. Geovanny MOLINA. The Role of the Nose in Snoring and Obstructive Sleep Apnoea: An Update.  Eur Arch Otorhinolaryngol. 2011; 268: 1365-73.  2.  Kathy SM; Sapna JA; Annamaria JR; Pallanch JF; Miguel Angel MB; Devika SG; Tiffanie NICHOLSON. Surgical modifications of the upper airway for obstructive sleep apnea in adults: a systematic review and meta-analysis. SLEEP 2010;33(10):9316-5685. Bobby BURROWS. Hypopharyngeal surgery in obstructive sleep apnea: an evidence-based medicine review.  Arch Otolaryngol Head Neck Surg. 2006 Feb;132(2):206-13.  3. Nehemiah YH1, Nat Y, Ismael SANDY. The efficacy of anatomically based multilevel surgery for obstructive sleep apnea. Otolaryngol Head Neck Surg. 2003 Oct;129(4):327-35.  4. Bobby BURROWS, Goldberg A. Hypopharyngeal Surgery in Obstructive Sleep Apnea: An Evidence-Based  Medicine Review. Arch Otolaryngol Head Neck Surg. 2006 Feb;132(2):206-13.  5. Lynsey PJ et al. Upper-Airway Stimulation for Obstructive Sleep Apnea.  N Engl J Med. 2014 Jan 9;370(2):139-49.  6. Annie Y et al. Increased Incidence of Cardiovascular Disease in Middle-aged Men with Obstructive Sleep Apnea. Am J Respir Crit Care Med; 2002 166: 159-165  7. Efren EM et al. Studying Life Effects and Effectiveness of Palatopharyngoplasty (SLEEP) study: Subjective Outcomes of Isolated Uvulopalatopharyngoplasty. Otolaryngol Head Neck Surg. 2011; 144: 623-631.              Your BMI is Body mass index is 35.05 kg/(m^2).  Weight management is a personal decision.  If you are interested in exploring weight loss strategies, the following discussion covers the approaches that may be successful. Body mass index (BMI) is one way to tell whether you are at a healthy weight, overweight, or obese. It measures your weight in relation to your height.  A BMI of 18.5 to 24.9 is in the healthy range. A person with a BMI of 25 to 29.9 is considered overweight, and someone with a BMI of 30 or greater is considered obese. More than two-thirds of American adults are considered overweight or obese.  Being overweight or obese increases the risk for further weight gain. Excess weight may lead to heart disease and diabetes.  Creating and following plans for healthy eating and physical activity may help you improve your health.  Weight control is part of healthy lifestyle and includes exercise, emotional health, and healthy eating habits. Careful eating habits lifelong are the mainstay of weight control. Though there are significant health benefits from weight loss, long-term weight loss with diet alone may be very difficult to achieve- studies show long-term success with dietary management in less than 10% of people. Attaining a healthy weight may be especially difficult to achieve in those with severe obesity. In some cases, medications, devices  and surgical management might be considered.  What can you do?  If you are overweight or obese and are interested in methods for weight loss, you should discuss this with your provider.     Consider reducing daily calorie intake by 500 calories.     Keep a food journal.     Avoiding skipping meals, consider cutting portions instead.    Diet combined with exercise helps maintain muscle while optimizing fat loss. Strength training is particularly important for building and maintaining muscle mass. Exercise helps reduce stress, increase energy, and improves fitness. Increasing exercise without diet control, however, may not burn enough calories to loose weight.       Start walking three days a week 10-20 minutes at a time    Work towards walking thirty minutes five days a week     Eventually, increase the speed of your walking for 1-2 minutes at time    In addition, we recommend that you review healthy lifestyles and methods for weight loss available through the National Institutes of Health patient information sites:  http://win.niddk.nih.gov/publications/index.htm    And look into health and wellness programs that may be available through your health insurance provider, employer, local community center, or jennifer [employer].    Weight management plan: Patient was referred to their PCP to discuss a diet and exercise plan.

## 2017-09-08 NOTE — PROGRESS NOTES
SLEEP MEDICINE CLINIC       DATE OF SLEEP CLINIC VISIT:  09/07/2017.       LOCATION:  [facility], [place].        CHIEF COMPLAINT:  I have been asked to see Mr. Josr Cai by provider, Radha Adams, in consultation, for history of sleep apnea with return of symptoms 10 years following sleep surgery.      HISTORY OF PRESENT ILLNESS:  In 2007, he did have a diagnosis of both sleep apnea and insomnia.  Had surgery and had a nice response of a reduction in snoring and apneic events.  He has over time, however, gained about 50 pounds and has gone on to redevelop symptoms of loud snoring with snort or gasping arousals and witnessed apneas.  He is unable to sleep with his bed partner, and snoring and apneas can be heard from down the fall.  Does on occasion have heartburn and reports a dry throat in the morning.  Sleeps primarily on his stomach and sides, especially while sleeping on the couch.  Has a headache 2-3 times per week in the morning.  No signs of orexin deficiency, RLS, or nightmares or night terrors.  Enters bed at about 10:00 p.m. throughout the week and between 10:30 and 11:30.  May use his phone or reading articles for his master's program, may eat and on occasion watches TV.  Lights out at about 11:30 and falls asleep quite easily.  Wakes up 1-3 times a night and 2/7 days may take a couple hours to fall back asleep.  If he is unable to return to sleep easily, he will check the clock.  He will browse the Internet on his phone.  On rare occasion after 3:00-4:00 a.m. he may be up for the day.  He usually is getting 5-7 hours of sleep at night.  Feels best if he gets 8.  Does not nap.      SOCIAL, PERSONAL AND FAMILY HISTORY:  He is single, lives with his fiancee.  He is getting his master's and currently works at [email or web address].  Sleep environment conducive to good sleep.  Rare occasion a cat may awaken him or his bed partner with complaints of his sleep disordered breathing.  "  Family history of sleep apnea. ALCOHOL:  1-3/7 days, usually on a weekend may have some alcohol between 6:00 p.m. and 1:00 a.m., rare occasion will have a beer at noon.  No significant caffeine use, no nicotine.  Does exercise.      [place] Sleepiness Scale today is 0/24 but 20/30 days he is troubled by tiredness or fatigue, 15/30 days states mental health is not good and has problems with depression and anxiety.        REVIEW OF SYSTEMS:  A 14-point comprehensive review of systems completed and negative with the exception of the following:   DIGESTIVE SYSTEM:  On occasion has some reflux at night.   ENDOCRINE:  Nocturia at night.   MENTAL HEALTH:  Depression and anxiety.      SURGICAL HISTORY:  Sleep apnea correction in 2008, 2005 adenoid removal with improvement in nasal airflow.      PAST MEDICAL HISTORY:  Chronic heartburn, diagnosed in 07/2017.       Allergies:    No Known Allergies    Medications:    Current Outpatient Prescriptions   Medication Sig Dispense Refill     ranitidine (ZANTAC) 150 MG tablet Take 1 tablet (150 mg) by mouth 2 times daily 180 tablet 3       Problem List:  Patient Active Problem List    Diagnosis Date Noted     Gastroesophageal reflux disease without esophagitis 06/22/2017     Priority: Medium     Snoring 06/22/2017     Priority: Medium     Obstructive sleep apnea syndrome 06/22/2017     Priority: Medium     Shin splints, left, initial encounter 05/23/2016     Priority: Medium     Flat feet, bilateral 05/23/2016     Priority: Medium        Past Medical/Surgical History:  Past Medical History:   Diagnosis Date     Anxiety      Dysthymia      Flat feet      Sleep apnea      Past Surgical History:   Procedure Laterality Date     HC TOOTH EXTRACTION W/FORCEP       SOFT TISSUE SURGERY      nose and throat due to sleep apnea           Physical Examination:  Vitals: /78  Pulse 80  Resp 16  Ht 1.88 m (6' 2\")  Wt 123.8 kg (273 lb)  SpO2 94%  BMI 35.05 kg/m2  BMI= Body mass index " is 35.05 kg/(m^2).    Neck Cir (cm): 44 cm    [place] Total Score 9/7/2017   Total score - [place] 0       GENERAL APPEARANCE: healthy, alert and no distress  EYES: Eyes grossly normal to inspection  HENT: oropharynx crowded, soft palate dependent with small oral airway  NECK: thyroid normal to palpation  RESP: lungs clear to auscultation - no rales, rhonchi or wheezes  CV: regular rates and rhythm, normal S1 S2, no S3 or S4 and no murmur, click or rub  Extremities are without clubbing, edema  Musculoskeletal:  Mallampati Class: IV.  Tonsillar Stage: 0  surgically removed.  Dental: Dentition in good condition.  Good advancement of lower jaw without tmd  Skin: without facial rash     ASSESSMENT AND PLAN:  It is my impression that Mr. Cai is struggling with return of the sleep apnea-hypopnea syndrome.  His STOP-Bang score today is at least 6/8 in an individual who is status post what I am assuming was uvulopalatopharyngoplasty surgery.  He has gained 50 pounds in weight since that surgery and the following plan of care has been developed:   1.  Snoring with witnessed apneas in the setting of a gentleman status post uvulopalatopharyngoplasty.  He would like to do an in-home sleep test.  Does report some desire to not return to the sleep lab if possible.  With his on and off difficulties with sleep initiation, sleep maintenance at times, I have suggested some brief behavioral techniques to see if they may help improve his sleep initiation and return to sleep with wakeups.  If he has significant success with these measures, he will move forward with Home Sleep Test.  If not, he will contact me in approximately 7-10 days and let me know his response via TrepUpt and will consider an in-lab study with a sleep aid.  He does appear to be interested in both a dental appliance and/or perhaps even considering CPAP and is working hard to lose weight to reduce his weight to reduce his risk for worsening obstructive sleep apnea.   I will see him in followup following his study.   2.  Obesity.  As indicated above a 50-pound weight gain.  He is doing everything he can with being employed and being in grad school to try and lose weight.   3.  Intermittent psychophysiologic insomnia.  We did talk about some brief behavioral techniques that would improve sleep as of the following:    I have asked him to cover his clocks, to sleep in the living room on the couch so that he does not have to be cued to move from the bed to the living room further disrupting sleep continuity, and avoid using his couch and/or bed for multiple purposes in particular screen use within close field of vision prior to bedtime.  He will employ these measures and let me know how they work in the next 7-10 days.  If successful, he will proceed with his Home Sleep Test, if not, he will contact me via [employer] and we will order an in-lab polysomnogram.  We will discuss further measures and possible need to address insomnia following his sleep study and intervention of choice pending a positive test to see if insomnia persists as a problem.      Thank you for allowing me to participate in this kind man's care.      Time spent with patient 60 minutes, of which greater than 50% was spent in counseling, education and coordination of care.         TAWANDA AYALA, CNP             D: 2017 10:01   T: 2017 10:52   MT: ANINA      Name:     YESSI ESCOBAR   MRN:      [id number]        Account:      [id number]   :      1990           Visit Date:   2017      Document: [id number]

## 2017-09-14 ENCOUNTER — TELEPHONE (OUTPATIENT)
Dept: SLEEP MEDICINE | Facility: CLINIC | Age: 27
End: 2017-09-14

## 2017-09-14 NOTE — TELEPHONE ENCOUNTER
Talked to pt on 09/14/17 at 10:48, Susana wanted to see if pt was comfortable coming in for an inlab , called pt and pt  is willing to come in for the inlab   as long as its covered, I told the pt that once Susana puts the orders in I will give him a call back to schedule the inlab. HST appt has been cancelled and the f/u as well.    Lakesha Gonzalez  , [facility]

## 2017-09-15 DIAGNOSIS — R06.83 SNORING: Primary | ICD-10-CM

## 2017-09-15 RX ORDER — ZOLPIDEM TARTRATE 10 MG/1
TABLET ORAL
Qty: 1 TABLET | Refills: 0 | Status: SHIPPED | OUTPATIENT
Start: 2017-09-15 | End: 2019-06-27

## 2017-09-24 ENCOUNTER — THERAPY VISIT (OUTPATIENT)
Dept: SLEEP MEDICINE | Facility: CLINIC | Age: 27
End: 2017-09-24
Attending: NURSE PRACTITIONER
Payer: COMMERCIAL

## 2017-09-24 DIAGNOSIS — R06.83 SNORING: ICD-10-CM

## 2017-09-24 PROCEDURE — 95811 POLYSOM 6/>YRS CPAP 4/> PARM: CPT | Mod: ZF

## 2017-09-24 NOTE — MR AVS SNAPSHOT
After Visit Summary   9/24/2017    Josr Cai    MRN: [id number]           Patient Information     Date Of Birth          1990        Visit Information        Provider Department      9/24/2017 8:00 PM SLEEP STUDY RM 2 [facility], [place], Sleep Study        Today's Diagnoses     Snoring          Care Instructions    [place] [facility]    1. Your sleep study will be reviewed by a sleep physician within the next few days.     2. Please follow up in the sleep clinic as scheduled, or, make an appointment with your sleep provider to be seen within two weeks to discuss the results of the sleep study.    3. If you have any questions or problems with your treatment plan, please contact your sleep clinic provider at [phone] to further manage your condition.    4. Please review your attached medication list, and, at your follow-up appointment advise your sleep clinic provider about any changes.    5. Go to [email or web address] for more information about your sleep problems.    Reid Ellison, BACILIO  September 25, 2017                Follow-ups after your visit        Your next 10 appointments already scheduled     Sep 29, 2017  4:00 PM CDT   Telephone Visit with TAWANDA Felix CNP   [facility] ([facility])    [address] [place] [id number] [phone]           Note: this is not an onsite visit; there is no need to come to the facility.              Who to contact     If you have questions or need follow up information about today's clinic visit or your schedule please contact [facility] [place], SLEEP STUDY directly at [phone].  Normal or non-critical lab and imaging results will be communicated to you by MyChart, letter or phone within 4 business days after the clinic has received the results. If you do not hear from us within 7 days, please contact the clinic through MyChart or phone. If  you have a critical or abnormal lab result, we will notify you by phone as soon as possible.  Submit refill requests through Varolii or call your pharmacy and they will forward the refill request to us. Please allow 3 business days for your refill to be completed.          Additional Information About Your Visit        SoWeTriphart Information     Varolii gives you secure access to your electronic health record. If you see a primary care provider, you can also send messages to your care team and make appointments. If you have questions, please call your primary care clinic.  If you do not have a primary care provider, please call [phone] and they will assist you.        Care EveryWhere ID     This is your Care EveryWhere ID. This could be used by other organizations to access your [facility] medical records  [id number]         Blood Pressure from Last 3 Encounters:   09/07/17 118/78   06/22/17 116/82   05/23/16 126/81    Weight from Last 3 Encounters:   09/07/17 123.8 kg (273 lb)   06/22/17 121.2 kg (267 lb 3.2 oz)   05/23/16 114.8 kg (253 lb)              We Performed the Following     Comprehensive Sleep Study        Primary Care Provider Office Phone # Fax #    Kathleen Ledbetter [phone]       [address] [place] [address]        Equal Access to Services     YASMIN BAILEY AH: Hadii magdalena ku hadasho Soomaali, waaxda luqadaha, qaybta kaalmada adeegyada, lyle stockton haykt ansari. So [facility] [phone].    ATENCIÓN: Si habla español, tiene a kirkland disposición servicios gratuitos de asistencia lingüística. Llame al [phone].    We comply with applicable federal civil rights laws and [place] laws. We do not discriminate on the basis of race, color, national origin, age, disability sex, sexual orientation or gender identity.            Thank you!     Thank you for choosing [facility], SLEEP STUDY  for your care. Our goal is always to provide you with excellent care.  Hearing back from our patients is one way we can continue to improve our services. Please take a few minutes to complete the written survey that you may receive in the mail after your visit with us. Thank you!             Your Updated Medication List - Protect others around you: Learn how to safely use, store and throw away your medicines at www.disposemymeds.org.          This list is accurate as of: 9/24/17 11:59 PM.  Always use your most recent med list.                   Brand Name Dispense Instructions for use Diagnosis    ranitidine 150 MG tablet    ZANTAC    180 tablet    Take 1 tablet (150 mg) by mouth 2 times daily    Gastroesophageal reflux disease without esophagitis       zolpidem 10 MG tablet    AMBIEN    1 tablet    Bring to lab. Take when psg tech says it's about time to start study.    Snoring

## 2017-09-25 DIAGNOSIS — J31.0 CHRONIC RHINITIS, UNSPECIFIED TYPE: ICD-10-CM

## 2017-09-25 DIAGNOSIS — G47.33 OBSTRUCTIVE SLEEP APNEA: Primary | ICD-10-CM

## 2017-09-25 NOTE — PATIENT INSTRUCTIONS
[place] [facility]    1. Your sleep study will be reviewed by a sleep physician within the next few days.     2. Please follow up in the sleep clinic as scheduled, or, make an appointment with your sleep provider to be seen within two weeks to discuss the results of the sleep study.    3. If you have any questions or problems with your treatment plan, please contact your sleep clinic provider at [phone] to further manage your condition.    4. Please review your attached medication list, and, at your follow-up appointment advise your sleep clinic provider about any changes.    5. Go to [email or web address] for more information about your sleep problems.    Reid Ellison, RPSGT  September 25, 2017

## 2017-09-25 NOTE — PROCEDURES
"SLEEP STUDY INTERPRETATION  SPLIT-NIGHT STUDY      Patient: Josr Cai  YOB: 1990  Study Date: 9/24/2017  MRN: [id number]  Referring Provider: Kathleen Ledbetter MD  Ordering Provider: MANNY Marmolejo    Indications for Polysomnography: The patient is a 26 y old male who is 6' 2\" and weighs 273.0 lbs.  His BMI is 35.0, [place] sleepiness scale is 0.0 and neck size is 44.0.  Relevant medical history includes GERD and previously diagnosed obstructive sleep apnea. A diagnostic polysomnogram was performed to evaluate for current severity of sleep apnea and response to CPAP. .  After 160.5 minutes of sleep time the patient exhibited sufficient respiratory events qualifying him for a CPAP trial which was then initiated.      Polysomnogram Data:  A full night polysomnogram recorded the standard physiologic parameters including EEG, EOG, EMG, ECG, nasal and oral airflow.  Respiratory parameters of chest and abdominal movements were recorded with respiratory inductance plethysmography.  Oxygen saturation was recorded by pulse oximetry.      Diagnostic PSG  Sleep Architecture: Sleep disruption attributable to respiratory events with decreased stage R.   The total recording time of the diagnostic portion of the study was 166.8 minutes.  The total sleep time was 160.5 minutes.  During the diagnostic portion of the study the sleep latency was decreased at 2.8 minutes with zolpidem. REM latency was 112.5 minutes.  Arousal index was increased at 36 arousals per hour.  Sleep efficiency was normal at 96.2%.  Wake after sleep onset was 3.5 minutes.   The patient spent 1.9% of total sleep time in Stage N1, 43.0% in Stage N2, 51.1% in Stage N3 and 4.0% in REM.       Respiration: Severe obstructive sleep apnea with hypoxemia.     Events - During the diagnostic portion of the study, the polysomnogram revealed a presence of 47 obstructive, 4 central apneas resulting in an apnea index of 19.1 events per hour.  There were " 79 hypopneas resulting in a hypopnea index of 29.5 events per hour.  The combined apnea/hypopnea index was 48.6 events per hour.  The REM AHI was - events per hour.  The supine AHI was 118.2 events per hour.  The RERA index was 0.7 events per hour.  The RDI was 49.3 events per hour.     Snoring - was reported as loud\intermittent.    Respiratory rate and pattern - was notable for normal respiratory rate and pattern.    Sustained Sleep Associated Hypoventilation - Transcutaneous carbon dioxide monitoring was not used.    Sleep Associated Hypoxemia - (Greater than 5 minutes O2 sat below 89%) was present.  Baseline oxygen saturation was 92.6%. Lowest oxygen saturation was 64.5%.  Time spent less than or equal to 88% was 14.1 minutes.  Time spent less than or equal to 89% was 15.6 minutes.  49.3 0.7 48.6     Treatment PSG  Sleep Architecture: Normal.   At 02:01:03 AM the patient was placed on CPAP treatment and was titrated at pressures ranging from 5 cmH2O up to 6 cmH2O.  The total recording time of the treatment portion of the study was 246.7 minutes.  The total sleep time was 236.0 minutes.  During the treatment portion of the study the sleep latency was 0.0 minutes.  REM latency was 44.5 minutes.  Arousal index was normal at 5.6 arousals per hour.  Sleep efficiency was normal at 95.6%.  Wake after sleep onset was 10.0 minutes.  The patient spent 1.9% of total sleep time in Stage N1, 77.1% in Stage N2, 2.8% in Stage N3 and 18.2% in REM.       Respiration: Elimination of hypoxemia and significant respiratory events on CPAP.   The optimal pressure was 6 cmH2O with an AHI of 3.3 events per hour.  Time in REM supine on final pressure was 27.0 minutes.   This titration was considered optimal (residual AHI < 5 events per hour and including REM-supine sleep at final pressure).     Movement Activity: No abnormal sleep behaviors.     Periodic Limb Movements  o During the entire study, there were 0 PLMs recorded.    REM EMG  Activity - Excessive transient / sustained muscle activity was not present.    Nocturnal Behavior - Abnormal sleep related behaviors were not noted.    Bruxism - None apparent.    Cardiac Summary: Normal sinus rhythm.   During the diagnostic portion of the study, the average pulse rate was 79.0 bpm.  The minimum pulse rate was 56.3 bpm while the maximum pulse rate was 101.1 bpm.    During the treatment portion of the study, the average pulse rate was 76.9 bpm.  The minimum pulse rate was 63.0 bpm while the maximum pulse rate was 109.2 bpm.     Arrhythmias were not noted.      Assessment:     Severe obstructive sleep apnea with sleep disruption and hypoxemia effectively treated with CPAP. These results were shared by phone with the patient.      Recommendations:    Consider treatment of SHWETHA with auto-titrating PAP therapy with a range of 5-15 cmH2O.  Recommend clinical follow up with sleep management team, including review of compliance measures.     Alternative therapies may include oral device therapy and long-term weight reduction though response would not be as predictable.    Suggest optimizing sleep schedule and avoiding sleep deprivation.    Weight management (if BMI > 30).    Cardiac Comments: Normal Sinus Rhythm  Diagnostic Codes:    Obstructive Sleep Apnea G47.33    Sleep Hypoxemia/Hypoventilation G47.36                                                                                       Electronically-Signed by Vishnu Hare 13:33  9/25/17

## 2017-09-25 NOTE — PROGRESS NOTES
Completed a split night PSG per provider order.    Preliminary AHI 33.  A final therapeutic PAP pressure was achieved.    Supine REM was seen on therapeutic pressure.    Patient reports feeling refreshed in AM.

## 2017-10-13 ENCOUNTER — VIRTUAL VISIT (OUTPATIENT)
Dept: SLEEP MEDICINE | Facility: CLINIC | Age: 27
End: 2017-10-13
Attending: NURSE PRACTITIONER
Payer: COMMERCIAL

## 2017-10-13 ENCOUNTER — TELEPHONE (OUTPATIENT)
Dept: SLEEP MEDICINE | Facility: CLINIC | Age: 27
End: 2017-10-13

## 2017-10-13 DIAGNOSIS — G47.33 OSA (OBSTRUCTIVE SLEEP APNEA): Primary | ICD-10-CM

## 2017-10-13 NOTE — MR AVS SNAPSHOT
After Visit Summary   10/13/2017    Josr Cai    MRN: [id number]           Patient Information     Date Of Birth          1990        Visit Information        Provider Department      10/13/2017 4:00 PM [facility] Lyons [facility]        Today's Diagnoses     SHWETHA (obstructive sleep apnea)    -  1       Follow-ups after your visit        Who to contact     If you have questions or need follow up information about today's clinic visit or your schedule please contact [facility] directly at [phone].  Normal or non-critical lab and imaging results will be communicated to you by I Gotchuhart, letter or phone within 4 business days after the clinic has received the results. If you do not hear from us within 7 days, please contact the clinic through Ubersnapt or phone. If you have a critical or abnormal lab result, we will notify you by phone as soon as possible.  Submit refill requests through Intercasting or call your pharmacy and they will forward the refill request to us. Please allow 3 business days for your refill to be completed.          Additional Information About Your Visit        MyChart Information     Inter[employer] gives you secure access to your electronic health record. If you see a primary care provider, you can also send messages to your care team and make appointments. If you have questions, please call your primary care clinic.  If you do not have a primary care provider, please call [phone] and they will assist you.        Care EveryWhere ID     This is your Care EveryWhere ID. This could be used by other organizations to access your [place] medical records  [id number]         Blood Pressure from Last 3 Encounters:   09/07/17 118/78   06/22/17 116/82   05/23/16 126/81    Weight from Last 3 Encounters:   09/07/17 123.8 kg (273 lb)   06/22/17 121.2 kg (267 lb 3.2 oz)   05/23/16 114.8 kg (253 lb)              Today, you had the following      No orders found for display       Primary Care Provider Office Phone # Fax #    Kathleen Kala Ledbetter [phone]       [address]  [facility] [address]        Equal Access to Services     YASMIN BAILEY : Hadii magdalena ku thanho Soyanaali, waaxda luqadaha, qaybta kaalmada aderogelio, lyle felix laRadhakt ansari. So [facility] [phone].    ATENCIÓN: Si habla español, tiene a kirkland disposición servicios gratuitos de asistencia lingüística. Llame al [phone].    We comply with applicable federal civil rights laws and [place] laws. We do not discriminate on the basis of race, color, national origin, age, disability, sex, sexual orientation, or gender identity.            Thank you!     Thank you for choosing [facility], SLEEP STUDY  for your care. Our goal is always to provide you with excellent care. Hearing back from our patients is one way we can continue to improve our services. Please take a few minutes to complete the written survey that you may receive in the mail after your visit with us. Thank you!             Your Updated Medication List - Protect others around you: Learn how to safely use, store and throw away your medicines at www.disposemymeds.org.          This list is accurate as of: 10/13/17  4:56 PM.  Always use your most recent med list.                   Brand Name Dispense Instructions for use Diagnosis    ranitidine 150 MG tablet    ZANTAC    180 tablet    Take 1 tablet (150 mg) by mouth 2 times daily    Gastroesophageal reflux disease without esophagitis       zolpidem 10 MG tablet    AMBIEN    1 tablet    Bring to lab. Take when psg tech says it's about time to start study.    Snoring

## 2017-10-13 NOTE — TELEPHONE ENCOUNTER
Cc: review psg.      PMHx: pt with past hx of michael, surgical correction effective for a period of time, now with return of symptoms.  Insomnia and MICHAEL.      [facility] for orders and questions re: change in insurance end of month.  Recommend he call and speak to billing on Monday ([facility] & his insurance,)    Recommendations for adaptation supplied through my chart.    Follow up stm and me in 7 wks.    A/p:  1. Michael: cpap recommended with desaturations and severity.    STM, follow up

## 2017-10-19 ENCOUNTER — DOCUMENTATION ONLY (OUTPATIENT)
Dept: SLEEP MEDICINE | Facility: CLINIC | Age: 27
End: 2017-10-19

## 2017-10-19 NOTE — PROGRESS NOTES
Patient was offered choice of vendor and chose [facility].  Patient Josr Ayala was set up at [place] on October 19, 2017. Patient received a Resmed AirSense 10 Auto. Pressures were set at 5-15 cm H2O.   Patient does not have ramp and FLEX/EPR is 2.  Patient received a Resmed Mask name: AIRFIT p10  Pillow mask Size Medium, heated tubing and heated humidifier.  Patient is enrolled in the STM Program and does not need to meet compliance.     Mary Quinones

## 2017-10-23 ENCOUNTER — DOCUMENTATION ONLY (OUTPATIENT)
Dept: SLEEP MEDICINE | Facility: CLINIC | Age: 27
End: 2017-10-23

## 2017-10-23 NOTE — PROGRESS NOTES
3 DAY STM VISIT    Patient contacted for 3 day STM visit  Message left for patient to return call     Device type: Auto-CPAP  PAP settings: CPAP min 5 cm  H20     CPAP max 15 cm  H20  Assessment: Nightly usage over four hours.  Action plan: Pt to have f/u 14 day STM visit.  Diagnostic AHI: 49.2

## 2017-10-25 NOTE — PROGRESS NOTES
Cc: review psg.       PMHx: pt with past hx of michael, surgical correction effective for a period of time, now with return of symptoms.  Insomnia and MICHAEL.       [facility] for orders and questions re: change in insurance end of month.  Recommend he call and speak to billing on Monday ([facility] & his insurance,)     Recommendations for adaptation supplied through my chart.     Follow up stm and me in 7 wks.     A/p:  1. Michael: cpap recommended with desaturations and severity.     STM, follow up    This was a 15 minute, scheduled telephone visit, with >50% spent in education, coordination of care.    Susana Lyons APRN, CNP-BC  Sleep Medicine

## 2017-11-02 ENCOUNTER — DOCUMENTATION ONLY (OUTPATIENT)
Dept: SLEEP MEDICINE | Facility: CLINIC | Age: 27
End: 2017-11-02

## 2017-11-02 NOTE — PROGRESS NOTES
14 DAY STM VISIT    Subjective measures:   Pt states things are going well and has no issues or complaints.  Pt is benefiting from therapy.    Assessment: Pt meeting objective benchmarks.  Patient meeting subjective benchmarks.   Action plan: pt to have 30 day STM visit.    Device type: Auto-CPAP  PAP settings: CPAP min 5 cm  H20     CPAP max 15 cm  H20    95th% pressure 8.6 cm  H20   Objective measures: 14 day rolling measures      Compliance  100 %      Leak  10.54 lpm  last  upload      AHI 4.05   last  upload      Average number of minutes 452    Diagnostic AHI: 49.2     Objective measure goal  Compliance   Goal >70%  Leak   Goal < 24 lpm  AHI  Goal < 5  Usage  Goal >240

## 2017-11-20 ENCOUNTER — DOCUMENTATION ONLY (OUTPATIENT)
Dept: SLEEP MEDICINE | Facility: CLINIC | Age: 27
End: 2017-11-20

## 2017-11-20 NOTE — PROGRESS NOTES
30 DAY STM VISIT    Subjective measures:   Pt states things are going well and has no issues or complaints.  Pt is benefiting from therapy.    Assessment: Pt meeting objective benchmarks.  Patient meeting subjective benchmarks.   Action plan: pt to have 6 month STM visit  Patient need a follow up visit with Susana Aquino NP.   Device type: Auto-CPAP  PAP settings: CPAP min 5 cm  H20     CPAP max 15 cm  H20    95th% pressure 8 cm  H20   Objective measures: 14 day rolling measures      Compliance  92 %      Leak  8.23 lpm  last  upload      AHI 3.06   last  upload      Average number of minutes 439    Diagnostic AHI: 49.2         Objective measure goal  Compliance   Goal >70%  Leak   Goal < 24 lpm  AHI  Goal < 5  Usage  Goal >240

## 2018-04-17 ENCOUNTER — DOCUMENTATION ONLY (OUTPATIENT)
Dept: SLEEP MEDICINE | Facility: CLINIC | Age: 28
End: 2018-04-17

## 2018-04-17 NOTE — PROGRESS NOTES
6 month [facility] Recheck Visit     Data only recheck     Assessment: Pt meeting objective benchmarks.     Action plan: pt to follow up per provider request (1-2 yrs)    Diagnostic AHI: 49.2     Device type: Auto-CPAP  PAP settings: CPAP min 5 cm  H20     CPAP max 15 cm  H20    95th% pressure 7.4 cm  H20   Objective measures: 14 day rolling measures      Compliance  100 %      Leak  9.2 lpm  last  upload      AHI 1.86   last  upload      Average number of minutes 434      Objective measure goal  Compliance   Goal >70%  Leak   Goal < 24 lpm  AHI  Goal < 5  Usage  Goal >240

## 2019-06-27 ENCOUNTER — ANCILLARY PROCEDURE (OUTPATIENT)
Dept: GENERAL RADIOLOGY | Facility: CLINIC | Age: 29
End: 2019-06-27
Attending: PHYSICIAN ASSISTANT
Payer: COMMERCIAL

## 2019-06-27 ENCOUNTER — NURSE TRIAGE (OUTPATIENT)
Dept: NURSING | Facility: CLINIC | Age: 29
End: 2019-06-27

## 2019-06-27 ENCOUNTER — OFFICE VISIT (OUTPATIENT)
Dept: URGENT CARE | Facility: URGENT CARE | Age: 29
End: 2019-06-27
Payer: COMMERCIAL

## 2019-06-27 VITALS
BODY MASS INDEX: 35.44 KG/M2 | DIASTOLIC BLOOD PRESSURE: 78 MMHG | TEMPERATURE: 97 F | SYSTOLIC BLOOD PRESSURE: 124 MMHG | HEART RATE: 74 BPM | OXYGEN SATURATION: 97 % | WEIGHT: 276 LBS

## 2019-06-27 DIAGNOSIS — S92.511A CLOSED DISPLACED FRACTURE OF PROXIMAL PHALANX OF LESSER TOE OF RIGHT FOOT, INITIAL ENCOUNTER: Primary | ICD-10-CM

## 2019-06-27 DIAGNOSIS — M79.674 PAIN OF TOE OF RIGHT FOOT: ICD-10-CM

## 2019-06-27 PROBLEM — E66.01 MORBID OBESITY (H): Status: ACTIVE | Noted: 2019-06-27

## 2019-06-27 PROCEDURE — 73660 X-RAY EXAM OF TOE(S): CPT | Mod: RT

## 2019-06-27 PROCEDURE — 99213 OFFICE O/P EST LOW 20 MIN: CPT | Performed by: PHYSICIAN ASSISTANT

## 2019-06-27 ASSESSMENT — PAIN SCALES - GENERAL: PAINLEVEL: MODERATE PAIN (4)

## 2019-06-27 NOTE — TELEPHONE ENCOUNTER
Josr has an acute right foot injury.  Pain on foot on the outside.  Unsure if broke toes.  Josr stubbed little toe on a door frame caught outside of foot.  Josr hurt his foot weeks ago and now is concerned that pain in moving to foot more.  Josr is able to walk.  Josr is requesting an xray.      Reason for Disposition    [1] Limp when walking AND [2] due to a twisted ankle or foot    Additional Information    Negative: Serious injury with multiple fractures    Negative: [1] Major bleeding (e.g., actively dripping or spurting) AND [2] can't be stopped    Negative: Amputation    Negative: Looks like a dislocated joint (very crooked or deformed)    Negative: Sounds like a life-threatening emergency to the triager    Negative: Bullet wound, stabbed by knife, or other serious penetrating wound    Negative: Skin is split open or gaping  (or length > 1/2 inch or 12 mm)    Negative: [1] Bleeding AND [2] won't stop after 10 minutes of direct pressure (using correct technique)    Negative: [1] Dirt in the wound AND [2] not removed with 15 minutes of scrubbing    Negative: Can't stand (bear weight) or walk    Negative: [1] Numbness (new loss of sensation) of toe(s) AND [2] present now    Negative: Sounds like a serious injury to the triager    Negative: [1] SEVERE pain AND [2] not improved 2 hours after pain medicine/ice packs    Negative: Suspicious history for the injury    Protocols used: FOOT AND ANKLE INJURY-A-AH

## 2019-06-27 NOTE — PATIENT INSTRUCTIONS
Patient Education     Closed Toe Fracture  Your toe is broken (fractured). This causes local pain, swelling, and sometimes bruising. This injury usually takes about 4 to 6 weeks to heal, but can sometimes take longer. Toe injuries are often treated by taping the injured toe to the next one (buddy taping). This protects the injured toe and holds it in position.     If the toenail has been severely injured, it may fall off in 1 to 2 weeks. It takes up to 12 months for a new toenail to grow back.  Home care  Follow these guidelines when caring for yourself at home:    You may be given a cast shoe to wear to keep your toe from moving. If not, you can use a sandal or any shoe that doesn t put pressure on the injured toe until the swelling and pain go away. If using a sandal, be careful not to strike your foot against anything. Another injury could make the fracture worse. If you were given crutches, don t put full weight on the injured foot until you can do so without pain, or as directed by your healthcare provider.    Keep your foot elevated to reduce pain and swelling. When sleeping, put a pillow under the injured leg. When sitting, support the injured leg so it is above your waist. This is very important during the first 2 days (48 hours).    Put an ice pack on the injured area. Do this for 20 minutes every 1 to 2 hours the first day for pain relief. You can make an ice pack by wrapping a plastic bag of ice cubes in a thin towel. As the ice melts, be careful that any cloth or paper tape doesn t get wet. Continue using the ice pack 3 to 4 times a day for the next 2 days. Then use the ice pack as needed to ease pain and swelling.    If buddy tape was used and it becomes wet or dirty, change it. You may replace it with paper, plastic, or cloth tape. Cloth tape and paper tapes must be kept dry.    You may use acetaminophen or ibuprofen to control pain, unless another pain medicine was prescribed. If you have chronic  liver or kidney disease, talk with your healthcare provider before using these medicines. Also talk with your provider if you ve had a stomach ulcer or gastrointestinal bleeding.    You may return to sports or physical education activities after 4 weeks when you can run without pain, or as directed by your healthcare provider.  Follow-up care  Follow up with your healthcare provider in 1 week, or as advised. This is to make sure the bone is healing the way it should.  X-rays may be taken. You will be told of any new findings that may affect your care.  When to seek medical advice  Call your healthcare provider right away if any of these occur:    Pain or swelling gets worse    The cast/splint cracks    The cast and padding get wet and stays wet more than 24 hours    Bad odor from the cast/splint or wound fluid stains the cast    Tightness or pressure under the cast/splint gets worse    Toe becomes cold, blue, numb, or tingly    You can t move the toe    Signs of infection: fever, redness, warmth, swelling, or drainage from the wound or cast    Fever of 100.4 F (38 C) or higher, or as directed by your healthcare provider  Date Last Reviewed: 2/1/2017 2000-2018 [employer]. [address] [place] [address], [place] [address]. All rights reserved. This information is not intended as a substitute for professional medical care. Always follow your healthcare professional's instructions.

## 2019-06-27 NOTE — PROGRESS NOTES
"    ALEX Cai presents today with right toe pain (patient points to right 5th MTP as site of pain).       HPI:  Patient sates he woke up in the middle of the night on 6/15/19 (12 days ago) --when he heard his cat vomiting--and jammed his toe into bedroom doorway. Initially he reports lots of redness, swelling and bruising. He states that has all resolved. Pain is improved but still present so he presents to  for evaluation of possible fracture.     Patient has already tried a CAM boot but tells me he was unable to tolerate any pressure \"on top of my foot\". He reports pain is currently a 4/10 when walking in close toed shoes and 2/10 with walking in open toe footwear. He reports no pain at rest.     Denies any associated ankle pain, tibia pain, fibula pain or knee pain.  Denies any LOC, head or neck pain associated with injury.     History of recurrent foot/ankle injuries: no  Pain is improved since   Aggravating factors: weight-bearing, dorsiflexion/plantarflexion.   Relieved by rest.    Home Tx: RICE. Buddy taping. Ibuprofen as needed.     Past Medical History:   Diagnosis Date     Anxiety      Dysthymia      Flat feet      Sleep apnea        Current Outpatient Medications   Medication     ranitidine (ZANTAC) 150 MG tablet     No current facility-administered medications for this visit.        No Known Allergies        OBJECTIVE:  /78   Pulse 74   Temp 97  F (36.1  C) (Tympanic)   Wt 125.2 kg (276 lb)   SpO2 97%   BMI 35.44 kg/m          EXAM: Patient appears alert,no apparent distress.  RIGHT Foot/ Ankle Exam:     Inspection: No bruising, redness or swelling   Palpation: positive tenderness over right 5th MTP. Non-tender on palpation of remainder of foot/toes, ankle, tibia, fibula or knee.   Circulation:   Both dorsalis pedis and posterior tibial pulses intact.  Good capillary refill all toes.   Neuro: positive for mildly antalgic gain. Sensation intact to light touch RLE     X-Ray right " "5th toe obtained to assess for possible fracture    Right toe 3 VIEW:     I reviewed my impression (positive for mildly displaced fracture head of 5th proximal phalanx), along with actual x-ray images, with patient and parent during his office visit today.  Radiologist over-read pending. Patient will need to be called if there are any acute findings on radiologist over-read.       ASSESSMENT/PLAN:    (S99.556A) Closed displaced fracture of proximal phalanx of lesser toe of right foot, initial encounter  (primary encounter diagnosis)  Plan: order for DME, PODIATRY/FOOT & ANKLE SURGERY         REFERRAL    1. Hard Soled Post-Op shoe is dispensed.   2. Avoid prolonged walking and prolonged standing to allow healing   3. RICE   4. Continue prn Ibuprofen or tylenol prn pain   5. Follow-up with PCP or podiatrist (referral provided) if sxs change, worsen or fail to fully resolve with above tx.  6.  In addition to the above, toe fracture \"red flag\" signs and sxs are reviewed with pt both verbally and by way of printed educational material for home review.  Pt verbalizes understanding of and agrees to the above plan.           (H64.170) Pain of toe of right foot  Plan: order for DME, PODIATRY/FOOT & ANKLE SURGERY         REFERRAL, CANCELED: XR Toe Left G/E 2 Views              "

## 2020-03-10 ENCOUNTER — HEALTH MAINTENANCE LETTER (OUTPATIENT)
Age: 30
End: 2020-03-10

## 2020-12-27 ENCOUNTER — HEALTH MAINTENANCE LETTER (OUTPATIENT)
Age: 30
End: 2020-12-27

## 2021-04-24 ENCOUNTER — HEALTH MAINTENANCE LETTER (OUTPATIENT)
Age: 31
End: 2021-04-24

## 2021-10-04 ENCOUNTER — HEALTH MAINTENANCE LETTER (OUTPATIENT)
Age: 31
End: 2021-10-04

## 2022-05-15 ENCOUNTER — HEALTH MAINTENANCE LETTER (OUTPATIENT)
Age: 32
End: 2022-05-15

## 2022-09-11 ENCOUNTER — HEALTH MAINTENANCE LETTER (OUTPATIENT)
Age: 32
End: 2022-09-11

## 2023-06-03 ENCOUNTER — HEALTH MAINTENANCE LETTER (OUTPATIENT)
Age: 33
End: 2023-06-03